# Patient Record
Sex: FEMALE | Race: WHITE | NOT HISPANIC OR LATINO | Employment: FULL TIME | URBAN - METROPOLITAN AREA
[De-identification: names, ages, dates, MRNs, and addresses within clinical notes are randomized per-mention and may not be internally consistent; named-entity substitution may affect disease eponyms.]

---

## 2024-07-01 ENCOUNTER — TELEPHONE (OUTPATIENT)
Dept: GASTROENTEROLOGY | Facility: CLINIC | Age: 24
End: 2024-07-01
Payer: COMMERCIAL

## 2024-07-01 NOTE — TELEPHONE ENCOUNTER
----- Message from Susan Cortés MA sent at 7/1/2024  2:50 PM CDT -----  Regarding: Possible New Patient Appointment  Contact: Kandy    ----- Message -----  From: Ronal Mcrae  Sent: 7/1/2024  12:58 PM CDT  To: Stephen HERNANDEZ Staff    Kandy called in regards to she would like to make an appointment to see . She is having severe stomach problem for years and have developed an hernia. Call back is [phone]

## 2024-07-18 DIAGNOSIS — R10.9 ABDOMINAL CRAMPING: Primary | ICD-10-CM

## 2024-07-18 DIAGNOSIS — R11.0 NAUSEA: ICD-10-CM

## 2024-07-21 ENCOUNTER — TELEPHONE (OUTPATIENT)
Dept: GASTROENTEROLOGY | Facility: CLINIC | Age: 24
End: 2024-07-21
Payer: COMMERCIAL

## 2024-07-21 NOTE — TELEPHONE ENCOUNTER
Referral reviewed. Referral states [facility] to [facility] [place].  Reviewed [facility]Med chart. ECF 2020 OV and EGD.    If the patient would like to transition GI care to me, then okay to schedule next available office visit with me.  Maintain follow-up with current GI provider until formally evaluated by me.  JAMILA

## 2025-02-13 ENCOUNTER — OFFICE VISIT (OUTPATIENT)
Dept: GASTROENTEROLOGY | Facility: CLINIC | Age: 25
End: 2025-02-13
Payer: COMMERCIAL

## 2025-02-13 ENCOUNTER — TELEPHONE (OUTPATIENT)
Dept: GASTROENTEROLOGY | Facility: CLINIC | Age: 25
End: 2025-02-13

## 2025-02-13 VITALS
HEART RATE: 71 BPM | OXYGEN SATURATION: 98 % | BODY MASS INDEX: 28.22 KG/M2 | HEIGHT: 67 IN | DIASTOLIC BLOOD PRESSURE: 83 MMHG | SYSTOLIC BLOOD PRESSURE: 131 MMHG | WEIGHT: 179.81 LBS

## 2025-02-13 DIAGNOSIS — R10.11 RUQ PAIN: ICD-10-CM

## 2025-02-13 DIAGNOSIS — K21.9 GASTROESOPHAGEAL REFLUX DISEASE, UNSPECIFIED WHETHER ESOPHAGITIS PRESENT: ICD-10-CM

## 2025-02-13 DIAGNOSIS — R10.31 RLQ ABDOMINAL PAIN: ICD-10-CM

## 2025-02-13 DIAGNOSIS — R10.9 ABDOMINAL CRAMPING: ICD-10-CM

## 2025-02-13 DIAGNOSIS — K76.9 LIVER LESION: Primary | ICD-10-CM

## 2025-02-13 DIAGNOSIS — R11.0 NAUSEA: ICD-10-CM

## 2025-02-13 DIAGNOSIS — R19.7 DIARRHEA, UNSPECIFIED TYPE: ICD-10-CM

## 2025-02-13 PROCEDURE — 1160F RVW MEDS BY RX/DR IN RCRD: CPT | Mod: CPTII,,, | Performed by: INTERNAL MEDICINE

## 2025-02-13 PROCEDURE — 1159F MED LIST DOCD IN RCRD: CPT | Mod: CPTII,,, | Performed by: INTERNAL MEDICINE

## 2025-02-13 PROCEDURE — 3075F SYST BP GE 130 - 139MM HG: CPT | Mod: CPTII,,, | Performed by: INTERNAL MEDICINE

## 2025-02-13 PROCEDURE — 99205 OFFICE O/P NEW HI 60 MIN: CPT | Mod: S$PBB,,, | Performed by: INTERNAL MEDICINE

## 2025-02-13 PROCEDURE — 3008F BODY MASS INDEX DOCD: CPT | Mod: CPTII,,, | Performed by: INTERNAL MEDICINE

## 2025-02-13 PROCEDURE — 3079F DIAST BP 80-89 MM HG: CPT | Mod: CPTII,,, | Performed by: INTERNAL MEDICINE

## 2025-02-13 RX ORDER — NORGESTIMATE AND ETHINYL ESTRADIOL 0.25-0.035
1 KIT ORAL EVERY MORNING
COMMUNITY
Start: 2025-01-10

## 2025-02-13 RX ORDER — PANTOPRAZOLE SODIUM 20 MG/1
20 TABLET, DELAYED RELEASE ORAL DAILY
Qty: 90 TABLET | Refills: 1 | Status: SHIPPED | OUTPATIENT
Start: 2025-02-13

## 2025-02-13 RX ORDER — SOD SULF/POT CHLORIDE/MAG SULF 1.479 G
TABLET ORAL
Qty: 24 TABLET | Refills: 0 | Status: SHIPPED | OUTPATIENT
Start: 2025-02-13

## 2025-02-13 NOTE — PATIENT INSTRUCTIONS
Schedule upper and lower endoscopies.  Submit stool test.  Submit blood test.  Start pantoprazole 20mg daily.  Obtain MRI report from [facility] of liver.    If any tests, procedures, or imaging has been ordered and you are not contacted to schedule within 1-2 weeks, then you may call the central scheduling department directly at [phone].   Please notify my office if you have not been contacted within two weeks after any procedures, submitting any samples (biopsies, blood, stool, urine, etc.) or after any imaging (X-ray, CT, MRI, etc.).

## 2025-02-13 NOTE — LETTER
February 13, 2025        Jillian Kumar MD  [address] [place] [address]             [facility]  [address] [place] [address]  Phone: [phone]  Fax: [phone]   Patient: Kandy Sheldon   MR Number: [id number]   YOB: 2000   Date of Visit: 2/13/2025       Dear Dr. Kumar:    Thank you for referring Kandy Sheldon to me for evaluation. Attached you will find relevant portions of my assessment and plan of care.    If you have questions, please do not hesitate to call me. I look forward to following Kandy Sheldon along with you.    Sincerely,      Fawn Mitchell MD CC Jessica G. Zaunbrecher, NP Enclosure

## 2025-02-13 NOTE — PROGRESS NOTES
Clinic Note    Reason for visit:  The primary encounter diagnosis was Liver lesion. Diagnoses of Abdominal cramping, Nausea, Diarrhea, unspecified type, RUQ pain, RLQ abdominal pain, and Gastroesophageal reflux disease, unspecified whether esophagitis present were also pertinent to this visit.    PCP: Ayesha Kearns   [address] [place] / [place] [address]    HPI:  This is a 24 y.o. female who was previously established with Dr. Burgos, last seen 2020. Referred for abdominal pain, diarrhea, hemorrhoids. She has had abdominal pain with meals for years. She had freeman last year at [facility] with Dr. Ivan due to GS. He found liver lesions while she was having her freeman. States her abdominal pain did not improve with freeman. She has completed a food diary and there was no correlation with abdominal pain except for spicy foods. She has h/o diarrhea but has worsened since freeman. Now having BM 3-4 times a day. Worse post-prandial. Denies nocturnal diarrhea. Did see blood in the stool at one point but that has since resolved. States she was on birth control many years ago. She stopped for the past few years. She recently was prescribed BC again due to menstrual cycle issues. Takes Aleve for 1 week out of the month for 7 days.    (Reviewed from patient's phone)  MRI abd 11/11/2024 3 x 2.4cm hyperintense liver mass, 1.9 x 1.9cm  liver lesion- features of these lesions most suggestive of FNH or hepatic adenoma in th absence- repeat imaging in 6-12 months     H/o abdominal pain w/o improvement with Levsin, dicyclomine.    US Chest axilla: negative    EGD 11/25/2020 with Dr. Burgos: linear furrows esophagus, gastritis, duodenitis- DBx wnl. REGP. LEBx-GERD changes. Rec IB Guard.    Review of Systems   Constitutional:  Negative for fatigue, fever and unexpected weight change.   HENT:  Negative for mouth sores, postnasal drip, sore throat and trouble swallowing.    Eyes:  Negative for pain, discharge and eye  dryness.   Respiratory:  Negative for apnea, cough, choking, chest tightness, shortness of breath and wheezing.    Cardiovascular:  Negative for chest pain, palpitations and leg swelling.   Gastrointestinal:  Positive for abdominal pain and diarrhea. Negative for abdominal distention, anal bleeding, blood in stool, change in bowel habit, constipation, nausea, rectal pain, vomiting, reflux and fecal incontinence.   Genitourinary:  Negative for bladder incontinence, dysuria and hematuria.   Musculoskeletal:  Negative for arthralgias, back pain and joint swelling.   Integumentary:  Negative for color change and rash.   Allergic/Immunologic: Negative for environmental allergies and food allergies.   Neurological:  Negative for seizures and headaches.   Hematological:  Negative for adenopathy. Does not bruise/bleed easily.        Past Medical History:   Diagnosis Date    BMI 28.0-28.9,adult      Past Surgical History:   Procedure Laterality Date    GALLBLADDER SURGERY  2024    KNEE SURGERY  2019     Family History   Problem Relation Name Age of Onset    Colon polyps Maternal Grandfather      Crohn's disease Maternal Aunt      Celiac disease Neg Hx      Liver disease Neg Hx      Rectal cancer Neg Hx      Stomach cancer Neg Hx      Liver cancer Neg Hx      Cystic fibrosis Neg Hx       Social History     Tobacco Use    Smoking status: Never    Smokeless tobacco: Never   Substance Use Topics    Alcohol use: Never    Drug use: Never     Review of patient's allergies indicates:  No Known Allergies     Medication List with Changes/Refills   New Medications    PANTOPRAZOLE (PROTONIX) 20 MG TABLET    Take 1 tablet (20 mg total) by mouth once daily.    SOD SULF-POT CHLORIDE-MAG SULF (SUTAB) 1.479-0.188- 0.225 GRAM TABLET    Take according to package instructions with indicated amount of water. No breakfast day before test. May substitute with Suprep, Clenpiq, Plenvu, Moviprep or GoLytely based on Rx plan and patient preference.  "  Current Medications    NORGESTIMATE-ETHINYL ESTRADIOL (ORTHO-CYCLEN) 0.25-35 MG-MCG PER TABLET    Take 1 tablet by mouth every morning.         Vital Signs:  /83   Pulse 71   Ht 5' 7" (1.702 m)   Wt 81.6 kg (179 lb 12.8 oz)   SpO2 98%   BMI 28.16 kg/m²         Physical Exam  Vitals reviewed.   Constitutional:       General: She is awake. She is not in acute distress.     Appearance: Normal appearance. She is well-developed. She is not ill-appearing, toxic-appearing or diaphoretic.   HENT:      Head: Normocephalic and atraumatic.      Nose: Nose normal.      Mouth/Throat:      Mouth: Mucous membranes are moist.      Pharynx: Oropharynx is clear. No oropharyngeal exudate or posterior oropharyngeal erythema.   Eyes:      General: Lids are normal. Gaze aligned appropriately. No scleral icterus.        Right eye: No discharge.         Left eye: No discharge.      Conjunctiva/sclera: Conjunctivae normal.   Neck:      Trachea: Trachea normal.   Cardiovascular:      Rate and Rhythm: Normal rate and regular rhythm.      Pulses:           Radial pulses are 2+ on the right side and 2+ on the left side.   Pulmonary:      Effort: Pulmonary effort is normal. No respiratory distress.      Breath sounds: No stridor. No wheezing.   Chest:      Chest wall: No tenderness.   Abdominal:      General: Bowel sounds are normal. There is no distension.      Palpations: Abdomen is soft. There is no fluid wave, hepatomegaly or mass.      Tenderness: There is abdominal tenderness in the right lower quadrant. There is no guarding or rebound.   Musculoskeletal:         General: No tenderness or deformity.      Cervical back: No tenderness.      Right lower leg: No edema.      Left lower leg: No edema.   Lymphadenopathy:      Cervical: No cervical adenopathy.   Skin:     General: Skin is warm and dry.      Capillary Refill: Capillary refill takes less than 2 seconds.      Coloration: Skin is not cyanotic, jaundiced or pale. "   Neurological:      General: No focal deficit present.      Mental Status: She is alert and oriented to person, place, and time.      Motor: No tremor.   Psychiatric:         Attention and Perception: Attention normal.         Mood and Affect: Mood and affect normal.         Speech: Speech normal.         Behavior: Behavior normal. Behavior is cooperative.            All of the data above and below has been reviewed by myself and any further interpretations will be reflected in the assessment and plan.   The data includes review of external notes, and independent interpretation of lab results, procedures, x-rays, and imaging reports.      Assessment:  Liver lesion    Abdominal cramping  -     Ambulatory referral/consult to Gastroenterology    Nausea  -     Ambulatory referral/consult to Gastroenterology    Diarrhea, unspecified type  -     Calprotectin, Stool; Future; Expected date: 02/13/2025  -     Clostridium diff Toxin/GDH w/ reflex PCR; Future; Expected date: 02/13/2025  -     Fecal fat, qualitative; Future; Expected date: 02/13/2025  -     Giardia / Cryptosporidum, EIA; Future; Expected date: 02/13/2025  -     Pancreatic elastase, fecal; Future; Expected date: 02/13/2025  -     Sedimentation rate; Future; Expected date: 02/13/2025  -     Endomysial antibody, IgA titer; Future; Expected date: 02/13/2025  -     Gliadin (Deamidated) Ab (IgG); Future; Expected date: 02/13/2025  -     Gliadin (Deamidated) Ab IgA; Future; Expected date: 02/13/2025  -     IgA; Future; Expected date: 02/13/2025  -     Tissue Transglutaminase, IgA; Future; Expected date: 02/13/2025  -     Ambulatory Referral to External Surgery  -     sod sulf-pot chloride-mag sulf (SUTAB) 1.479-0.188- 0.225 gram tablet; Take according to package instructions with indicated amount of water. No breakfast day before test. May substitute with Suprep, Clenpiq, Plenvu, Moviprep or GoLytely based on Rx plan and patient preference.  Dispense: 24 tablet;  Refill: 0    RUQ pain  -     Ambulatory Referral to External Surgery    RLQ abdominal pain  -     Ambulatory Referral to External Surgery    Gastroesophageal reflux disease, unspecified whether esophagitis present  -     pantoprazole (PROTONIX) 20 MG tablet; Take 1 tablet (20 mg total) by mouth once daily.  Dispense: 90 tablet; Refill: 1      Diarrhea- obtain stool test- r/o infection, malabsorption, EPI, inflammation. Labs to r/o celiac disease. Colonoscopy with CBx.  Upper abdominal pain- worse after meals. Will plan on EGD w/E/G/D bx. May warrant MRE versus CE to evaluate for small bowel inflammation.  Liver lesion- concerning for hepatic adenoma. Repeat MRI 5/2025. Consider referral to hepatology.      Recommendations:    Schedule upper and lower endoscopies.  Submit stool test.  Submit blood test.  Start pantoprazole 20mg daily.  Obtain MRI report from [facility] of liver.    If any tests, procedures, or imaging has been ordered and you are not contacted to schedule within 1-2 weeks, then you may call the central scheduling department directly at [phone].     Risks, benefits, and alternatives of medical management, any associated procedures, and/or treatment discussed with the patient. Patient given opportunity to ask questions and voices understanding. Patient has elected to proceed with the recommended care modalities as discussed.    Instructed patient to notify my office if they have not been contacted within two weeks after any procedures, submitting any samples (biopsies, blood, stool, urine, etc.) or after any imaging (X-ray, CT, MRI, etc.).     Follow up in about 6 months (around 8/13/2025).    Order summary:  Orders Placed This Encounter   Procedures    Clostridium diff Toxin/GDH w/ reflex PCR    Calprotectin, Stool    Fecal fat, qualitative    Giardia / Cryptosporidum, EIA    Pancreatic elastase, fecal    Sedimentation rate    Endomysial antibody, IgA titer    Gliadin (Deamidated) Ab (IgG)    Gliadin  (Deamidated) Ab IgA    IgA    Tissue Transglutaminase, IgA    Ambulatory Referral to External Surgery      This assessment, plan, and documentation was performed in collaboration with Jessica Parker NP.     This document may have been created using a voice recognition transcribing system. Incorrect words or phrases may have been missed during proofreading. Please interpret accordingly or contact me for clarification.     Fawn Mitchell MD

## 2025-05-08 ENCOUNTER — TELEPHONE (OUTPATIENT)
Dept: GASTROENTEROLOGY | Facility: CLINIC | Age: 25
End: 2025-05-08
Payer: COMMERCIAL

## 2025-05-08 NOTE — TELEPHONE ENCOUNTER
----- Message from Jessica sent at 5/7/2025  4:32 PM CDT -----  Contact: OCTAVIO BRANTLEY [[id number]]  .Type:  Patient Returning CallWho Called:OCTAVIO BRANTLEY [[id number]]Does the patient know what this is regarding?:resultsWould the patient rather a call back or a response via [facility]? [facility] Call Back Number:.[phone] (home) Additional Information: Patient would like call back

## 2025-05-12 ENCOUNTER — TELEPHONE (OUTPATIENT)
Dept: GASTROENTEROLOGY | Facility: CLINIC | Age: 25
End: 2025-05-12
Payer: COMMERCIAL

## 2025-05-12 NOTE — TELEPHONE ENCOUNTER
----- Message from Kaci sent at 5/12/2025  4:02 PM CDT -----  Contact: self  Type:  Needs Medical AdviceWho Called: Kandy Nevarezould the patient rather a call back or a response via [facility]? [facility] Call Back Number: [phone] Information: pt stating she was told her MRI orders would be sent to [facility] but they told her they do not have any orders for her. States she is needing to to schedule due her going on vacation 05/22

## 2025-05-20 ENCOUNTER — RESULTS FOLLOW-UP (OUTPATIENT)
Dept: GASTROENTEROLOGY | Facility: CLINIC | Age: 25
End: 2025-05-20

## 2025-05-20 NOTE — LETTER
May 29, 2025    Kandy Sheldon  [address]  [place] [address]          Hi Ms. Gaitan,     Our office has been trying to reach you but have been unsuccessful. Please give our office a call back or message us back on the portal at your earliest convenience.     Sincerely,    Nora Gardner MA  [facility]  Fawn Mitchell MD  Phone: [phone]  Fax: [phone]

## 2025-05-20 NOTE — PROGRESS NOTES
MRI [facility] 5/15/2025: 2-3cm T2 hyperintense lesion in hepatic segment 4. Probable focal nodular hyperplasia

## 2025-05-26 NOTE — TELEPHONE ENCOUNTER
MRI ABD [facility] 5/15/2025: 2-3cm T2 hyperintense lesion in hepatic segment 4. Probable focal nodular hyperplasia     Call with results. Her MRI showed her liver lesion is stable. We will continue to monitor and repeat MRI in 6 months. She should keep her follow up and notify us if any issues.  LEEROY

## 2025-05-28 NOTE — TELEPHONE ENCOUNTER
Called patient. No answer. Lvm telling her to call the office back. Portal message sent to patient. Reminder set to notify me if not read by tomorrow. DMP

## 2025-05-29 NOTE — TELEPHONE ENCOUNTER
Called patient. No answer. Lvm telling her to call the office back or message me back on the portal. Letter sent. DMP

## 2025-07-07 ENCOUNTER — TELEPHONE (OUTPATIENT)
Dept: GASTROENTEROLOGY | Facility: CLINIC | Age: 25
End: 2025-07-07
Payer: COMMERCIAL

## 2025-07-07 DIAGNOSIS — R10.31 RLQ ABDOMINAL PAIN: ICD-10-CM

## 2025-07-07 DIAGNOSIS — R10.11 RUQ PAIN: ICD-10-CM

## 2025-07-07 DIAGNOSIS — R19.7 DIARRHEA, UNSPECIFIED TYPE: Primary | ICD-10-CM

## 2025-07-07 NOTE — TELEPHONE ENCOUNTER
" [facility]      Provider: Fawn Mitchell MD    Patient Name: Kandy GRESHAM (age):2000  24 y.o.           Gender: female   Phone: [phone]     Referring Physician: Ayesha Kearns     Vital Signs:   Height - 5' 7"  Weight - 179 lb    Plan: EGD w/ G/Dbx/Colonoscopy    Encounter Diagnoses   Name Primary?    Diarrhea, unspecified type Yes    RUQ pain     RLQ abdominal pain          History:      Past Medical History:   Diagnosis Date    BMI 28.0-28.9,adult       Past Surgical History:   Procedure Laterality Date    GALLBLADDER SURGERY      KNEE SURGERY        Medication List with Changes/Refills   Current Medications    NORGESTIMATE-ETHINYL ESTRADIOL (ORTHO-CYCLEN) 0.25-35 MG-MCG PER TABLET    Take 1 tablet by mouth every morning.    PANTOPRAZOLE (PROTONIX) 20 MG TABLET    Take 1 tablet (20 mg total) by mouth once daily.    SOD SULF-POT CHLORIDE-MAG SULF (SUTAB) 1.479-0.188- 0.225 GRAM TABLET    Take according to package instructions with indicated amount of water. No breakfast day before test. May substitute with Suprep, Clenpiq, Plenvu, Moviprep or GoLytely based on Rx plan and patient preference.      Review of patient's allergies indicates:  No Known Allergies   Family History   Problem Relation Name Age of Onset    Colon polyps Maternal Grandfather      Crohn's disease Maternal Aunt      Celiac disease Neg Hx      Liver disease Neg Hx      Rectal cancer Neg Hx      Stomach cancer Neg Hx      Liver cancer Neg Hx      Cystic fibrosis Neg Hx        Social History[1]        [1]   Social History  Tobacco Use    Smoking status: Never    Smokeless tobacco: Never   Substance Use Topics    Alcohol use: Never    Drug use: Never     "

## 2025-07-07 NOTE — TELEPHONE ENCOUNTER
S/w pt and told her that I was calling as a courtesy regarding up coming EGD/COLON with [facility] on 7/14/25, Monday and wanted to verify that she has her paper prep instructions. Pt stated she has both.  I also mentioned that [facility] will call the day before (Fri) with the arrival time, GI Lab is located on the third floor, and to pre-register anytime this week. ashley

## 2025-07-14 ENCOUNTER — OUTSIDE PLACE OF SERVICE (OUTPATIENT)
Dept: GASTROENTEROLOGY | Facility: CLINIC | Age: 25
End: 2025-07-14

## 2025-07-14 LAB — CRC RECOMMENDATION EXT: NORMAL

## 2025-08-13 ENCOUNTER — OFFICE VISIT (OUTPATIENT)
Dept: GASTROENTEROLOGY | Facility: CLINIC | Age: 25
End: 2025-08-13
Payer: COMMERCIAL

## 2025-08-13 ENCOUNTER — TELEPHONE (OUTPATIENT)
Dept: GASTROENTEROLOGY | Facility: CLINIC | Age: 25
End: 2025-08-13

## 2025-08-13 VITALS
BODY MASS INDEX: 29.22 KG/M2 | SYSTOLIC BLOOD PRESSURE: 128 MMHG | WEIGHT: 186.19 LBS | HEIGHT: 67 IN | DIASTOLIC BLOOD PRESSURE: 82 MMHG | HEART RATE: 80 BPM | OXYGEN SATURATION: 98 %

## 2025-08-13 DIAGNOSIS — K76.9 LIVER LESION: ICD-10-CM

## 2025-08-13 DIAGNOSIS — R19.7 DIARRHEA, UNSPECIFIED TYPE: ICD-10-CM

## 2025-08-13 DIAGNOSIS — K21.9 GASTROESOPHAGEAL REFLUX DISEASE, UNSPECIFIED WHETHER ESOPHAGITIS PRESENT: Primary | ICD-10-CM

## 2025-08-13 DIAGNOSIS — R10.31 RLQ ABDOMINAL PAIN: ICD-10-CM

## 2025-08-13 PROCEDURE — 99214 OFFICE O/P EST MOD 30 MIN: CPT | Mod: S$PBB,,, | Performed by: NURSE PRACTITIONER

## 2025-08-13 PROCEDURE — 1160F RVW MEDS BY RX/DR IN RCRD: CPT | Mod: CPTII,,, | Performed by: NURSE PRACTITIONER

## 2025-08-13 PROCEDURE — 3079F DIAST BP 80-89 MM HG: CPT | Mod: CPTII,,, | Performed by: NURSE PRACTITIONER

## 2025-08-13 PROCEDURE — 3074F SYST BP LT 130 MM HG: CPT | Mod: CPTII,,, | Performed by: NURSE PRACTITIONER

## 2025-08-13 PROCEDURE — 3008F BODY MASS INDEX DOCD: CPT | Mod: CPTII,,, | Performed by: NURSE PRACTITIONER

## 2025-08-13 PROCEDURE — 1159F MED LIST DOCD IN RCRD: CPT | Mod: CPTII,,, | Performed by: NURSE PRACTITIONER

## 2025-08-27 ENCOUNTER — DOCUMENTATION ONLY (OUTPATIENT)
Dept: GASTROENTEROLOGY | Facility: CLINIC | Age: 25
End: 2025-08-27
Payer: COMMERCIAL